# Patient Record
Sex: FEMALE | Race: WHITE | Employment: OTHER | URBAN - METROPOLITAN AREA
[De-identification: names, ages, dates, MRNs, and addresses within clinical notes are randomized per-mention and may not be internally consistent; named-entity substitution may affect disease eponyms.]

---

## 2022-01-14 ENCOUNTER — TRANSFERRED RECORDS (OUTPATIENT)
Dept: HEALTH INFORMATION MANAGEMENT | Facility: CLINIC | Age: 67
End: 2022-01-14
Payer: COMMERCIAL

## 2022-01-28 ENCOUNTER — TRANSFERRED RECORDS (OUTPATIENT)
Dept: HEALTH INFORMATION MANAGEMENT | Facility: CLINIC | Age: 67
End: 2022-01-28
Payer: COMMERCIAL

## 2022-02-09 ENCOUNTER — TRANSCRIBE ORDERS (OUTPATIENT)
Dept: OTHER | Age: 67
End: 2022-02-09
Payer: COMMERCIAL

## 2022-02-09 DIAGNOSIS — Q82.8 PSEUDOXANTHOMA ELASTICUM: Primary | ICD-10-CM

## 2022-02-24 ENCOUNTER — VIRTUAL VISIT (OUTPATIENT)
Dept: CONSULT | Facility: CLINIC | Age: 67
End: 2022-02-24
Attending: GENETIC COUNSELOR, MS
Payer: COMMERCIAL

## 2022-02-24 DIAGNOSIS — Q82.8 PSEUDOXANTHOMA ELASTICUM: ICD-10-CM

## 2022-02-24 PROCEDURE — 96040 HC GENETIC COUNSELING, EACH 30 MINUTES: CPT | Mod: GT,95 | Performed by: GENETIC COUNSELOR, MS

## 2022-02-24 NOTE — PROGRESS NOTES
How would you like to obtain your AVS? MyChart  If the video visit is dropped, the invitation should be resent by: Send to e-mail at: [email or web address]  Will anyone else be joining your video visit? Sapphire Pelaez, EMT

## 2022-02-24 NOTE — LETTER
2/24/2022      RE: Catie Vazquez  [address] [place] [address]       Date of Service: February 24, 2022    Referring Provider: Dr. 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      Catie had a recent punch biopsy of her right axilla region due to yellowish papules and plaques.  She has these papules on her skin near both armpits, on her neck and near her groin.  The biopsy revealed pseudoxanthoma elasticum.  Catie's medical history also includes a left retinal detachment, left cataract, hypertension and sleep apnea.      Medical History:    Pseudoxanthoma elasticum    Retinal detachment s/p repair    Cataract, left    Hypertension    Sleep apnea    Family History:   A three generation pedigree was obtained today, will be scanned into the EMR, and is outlined below.       Catie has three daughters, all of whom are healthy.  Catie has seven healthy grandchildren.       Catie has two siblings.  Her brother was diagnosed with adrenomyeloneuropathy (AMN) in his early 20's.  AMN is an adult-onset form of X-linked adrenoleukodystrophy.  This brother has two children, including a son who had colitis and a daughter who is healthy and is a carrier of AMN.  Catie's sister is reported to be a carrier of AMN, and she has hypothyroidism.  This sister has two healthy children, and one of her granddaughters has epilepsy and is described as a slow learner.  Based on this family history, there is a 50% chance that Catie is a carrier of AMN.  Some carrier females can exhibit mild symptoms of AMN.  If the gene mutation associated with AMN has been identified for Catie's brother, then familial mutation testing would be an option for Catie if she would like to clarify the risk to her children and grandchildren.        Maternal family  history: Catie's mother had progressive spastic paraparesis that was diagnosed in her 40's, and she eventually developed vision loss attributed to this condition.  Catie described it as a form of MS.  Her mother  in her 70's.  It is unclear if her mother's spastic paraparesis diagnosis was actually hereditary spastic paraplegia (HSP).  If Catie's mother had HSP, then Catie and other relatives could be at risk to develop this condition.  Most cases of HSP exhibit autosomal dominant inheritance.  If Catie is able to learn more about her mother's specific condition, this would help to clarify the risk to other relatives and possible testing options.      Paternal family history: Catie's father  at age 86 from natural causes.  He had a history of hypertension, Parkinson disease, and he was hard of hearing as an adult.  Catie's paternal grandfather  at age 59 from throat cancer, and one of Catie's paternal uncles had prostate cancer.      The family history is otherwise negative for reports of birth defects, intellectual disability, other genetic disorders, seizures, congenital vision and hearing loss, and recurrent pregnancy loss / stillbirth.      Catie's maternal ancestry is [place]i.  Her paternal ancestry is Nigerien, [place]anian and [place].  There is no known consanguinity in the family.    Genetics:  We briefly reviewed that our genetic information (DNA) directs all aspects of our bodies' growth and development.  This information is encoded in individual units called genes.  Our genes come in pairs; one copy comes from our mother and one copy comes from our father.  Sometimes, there can be an error in the DNA code that makes up a gene, and the body cannot understand the genetic instruction.  Mistakes like these are called  mutations  or  pathogenic variants , and they can cause genetic disorders.  In some cases the genetic condition is inherited from one or both parents, and some cases occur brand new (de  ayesha) in the affected individual.    Pseudoxanthoma elasticum (PXE) is a genetic disorder characterized by the accumulation of deposits of calcium and other minerals in elastic fibers, which are a component of connective tissue.  This mineralization can impact several areas throughout the body including the skin (cobblestone rash), eyes (retinal changes leading to vision loss), cardiovascular system (narrowing of blood vessels, calcification of the arteries), and gastrointestinal system (GI bleeding).  Symptoms can be highly variable.  Some individuals have several features that can be progressive over time.  Other individuals have very few symptoms.    PXE is caused by mutations in the ABCC6 gene.  Affected individuals have a mutation in both copies of the gene (total of two mutations).  Parents of an affected individual have a mutation in just one copy of the gene and are carriers of the condition.  When both parents are carriers, every child between the couple has a 25% chance to inherit both mutations and have the condition.  This is known as autosomal recessive inheritance.    Genetic Testing:  Genetic testing for Catie will be done by [facility], and will involve analysis of ABCC6 gene to look for any errors in the sequence of the DNA letters (misspellings), or if there are any missing or extra DNA letters (deletions and duplications).  These types of errors can disrupt a gene and cause it to not work properly.    We reviewed the benefits, limitations, and possible results from genetic testing which can include:      Positive - a mutation(s) was identified that is known to be associated with PXE, and is thought to explain Catie's features.  A positive result may provide more information on appropriate clinical management for Catie.        Negative/normal - no mutations were identified in the ABCC6 gene.  All genetic tests have limitations, and a negative result would not exclude a genetic cause for Catie's  symptoms.      Variant of uncertain significance (VUS) - a change in the DNA sequence of the ABCC6 gene was identified, but there is not enough information to determine if the DNA change is disease-causing or benign.  It is unclear if the variant is contributing to Catie's features.  If a variant of uncertain significance is identified, testing of other relatives may be helpful to provide additional clarification.  In most cases, identification of a VUS does not confirm a diagnosis and does not result in any clinically actionable recommendations.    Genetic test results will influence ongoing management and surveillance for Catie.  If testing for Catie confirms a diagnosis of PXE, she would need regular screening / monitoring for potential complications which can include arteriosclerosis, bleeding and scarring of the retina (which can cause vision loss), and bleeding from blood vessels in the digestive tract.  Genetic results may also help to clarify the risk to other family members.  For these reasons, this recommended genetic testing for Catie is medically necessary.    Billing:  We reviewed the direct insurance billing process through [employer].  Once [facility] receives the sample, the lab will reach out to the insurance company to do an estimation of benefits, and will contact the patient if the estimated out of pocket cost is expected to be > $100.  If the estimated out of pocket cost is too high for the family or if insurance coverage is denied, [employer] offers a patient-pay price of $250.  If the estimated out of pocket cost is expected to be < $100, the patient will not be contacted by the lab and testing will be automatically initiated.    Lab results may be automatically released via [employer].  Department protocol is to hold genetic testing results until we have reviewed them. We will then contact the family directly to disclose the results and ensure they receive a copy of the report. This protocol was  reviewed with the family, who were in agreement to hold the results for genetics review and direct contact.    Plan / Summary:  1. Genetic testing was recommended for Catie and will involve analysis of the ABCC6 gene associated with PXE.  Catie provided informed consent to proceed with the testing.  2. [facility] lab will mail Catie a buccal collection test kit.  Testing will be initiated once the lab has received the sample.  Results should be available in about 4 weeks and will be returned by phone.  3. Further follow up from a genetics perspective will depend on Catie's genetic test results.    4. Catie was provided with my contact information and encouraged to reach out with questions or concerns.       Ly Garnica MS, [facility]  Licensed Genetic Counselor  [facility]  [phone]    Approximate Time Spent in Consultation: 55 minutes     Video-Visit Details     Type of service:  Video Visit    Video Start Time: 8:00 AM  Video End Time: 8:55 AM    Originating Location (pt. Location): Home    Distant Location (provider location):  [facility]        Ly Garnica GC

## 2022-02-24 NOTE — PROGRESS NOTES
Date of Service: February 24, 2022    Referring Provider: Dr. 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      Catie had a recent punch biopsy of her right axilla region due to yellowish papules and plaques.  She has these papules on her skin near both armpits, on her neck and near her groin.  The biopsy revealed pseudoxanthoma elasticum.  Catie's medical history also includes a left retinal detachment, left cataract, hypertension and sleep apnea.      Medical History:    Pseudoxanthoma elasticum    Retinal detachment s/p repair    Cataract, left    Hypertension    Sleep apnea    Family History:   A three generation pedigree was obtained today, will be scanned into the EMR, and is outlined below.       Catie has three daughters, all of whom are healthy.  Catie has seven healthy grandchildren.       Catie has two siblings.  Her brother was diagnosed with adrenomyeloneuropathy (AMN) in his early 20's.  AMN is an adult-onset form of X-linked adrenoleukodystrophy.  This brother has two children, including a son who had colitis and a daughter who is healthy and is a carrier of AMN.  Catie's sister is reported to be a carrier of AMN, and she has hypothyroidism.  This sister has two healthy children, and one of her granddaughters has epilepsy and is described as a slow learner.  Based on this family history, there is a 50% chance that Catie is a carrier of AMN.  Some carrier females can exhibit mild symptoms of AMN.  If the gene mutation associated with AMN has been identified for Catie's brother, then familial mutation testing would be an option for Catie if she would like to clarify the risk to her children and grandchildren.        Maternal family history: Catie's mother had progressive spastic paraparesis that was diagnosed in her  40's, and she eventually developed vision loss attributed to this condition.  Catie described it as a form of MS.  Her mother  in her 70's.  It is unclear if her mother's spastic paraparesis diagnosis was actually hereditary spastic paraplegia (HSP).  If Catie's mother had HSP, then Catie and other relatives could be at risk to develop this condition.  Most cases of HSP exhibit autosomal dominant inheritance.  If Catie is able to learn more about her mother's specific condition, this would help to clarify the risk to other relatives and possible testing options.      Paternal family history: Catie's father  at age 86 from natural causes.  He had a history of hypertension, Parkinson disease, and he was hard of hearing as an adult.  Catie's paternal grandfather  at age 59 from throat cancer, and one of Catie's paternal uncles had prostate cancer.      The family history is otherwise negative for reports of birth defects, intellectual disability, other genetic disorders, seizures, congenital vision and hearing loss, and recurrent pregnancy loss / stillbirth.      Catie's maternal ancestry is [place].  Her paternal ancestry is Ivorian, [place]onian and [place].  There is no known consanguinity in the family.    Genetics:  We briefly reviewed that our genetic information (DNA) directs all aspects of our bodies' growth and development.  This information is encoded in individual units called genes.  Our genes come in pairs; one copy comes from our mother and one copy comes from our father.  Sometimes, there can be an error in the DNA code that makes up a gene, and the body cannot understand the genetic instruction.  Mistakes like these are called  mutations  or  pathogenic variants , and they can cause genetic disorders.  In some cases the genetic condition is inherited from one or both parents, and some cases occur brand new (de ayesha) in the affected individual.    Pseudoxanthoma elasticum (PXE) is a genetic  disorder characterized by the accumulation of deposits of calcium and other minerals in elastic fibers, which are a component of connective tissue.  This mineralization can impact several areas throughout the body including the skin (cobblestone rash), eyes (retinal changes leading to vision loss), cardiovascular system (narrowing of blood vessels, calcification of the arteries), and gastrointestinal system (GI bleeding).  Symptoms can be highly variable.  Some individuals have several features that can be progressive over time.  Other individuals have very few symptoms.    PXE is caused by mutations in the ABCC6 gene.  Affected individuals have a mutation in both copies of the gene (total of two mutations).  Parents of an affected individual have a mutation in just one copy of the gene and are carriers of the condition.  When both parents are carriers, every child between the couple has a 25% chance to inherit both mutations and have the condition.  This is known as autosomal recessive inheritance.    Genetic Testing:  Genetic testing for Catie will be done by [facility], and will involve analysis of ABCC6 gene to look for any errors in the sequence of the DNA letters (misspellings), or if there are any missing or extra DNA letters (deletions and duplications).  These types of errors can disrupt a gene and cause it to not work properly.    We reviewed the benefits, limitations, and possible results from genetic testing which can include:      Positive - a mutation(s) was identified that is known to be associated with PXE, and is thought to explain Catie's features.  A positive result may provide more information on appropriate clinical management for Catie.        Negative/normal - no mutations were identified in the ABCC6 gene.  All genetic tests have limitations, and a negative result would not exclude a genetic cause for Catie's symptoms.      Variant of uncertain significance (VUS) - a change in the DNA  sequence of the ABCC6 gene was identified, but there is not enough information to determine if the DNA change is disease-causing or benign.  It is unclear if the variant is contributing to Catie's features.  If a variant of uncertain significance is identified, testing of other relatives may be helpful to provide additional clarification.  In most cases, identification of a VUS does not confirm a diagnosis and does not result in any clinically actionable recommendations.    Genetic test results will influence ongoing management and surveillance for Catie.  If testing for Catie confirms a diagnosis of PXE, she would need regular screening / monitoring for potential complications which can include arteriosclerosis, bleeding and scarring of the retina (which can cause vision loss), and bleeding from blood vessels in the digestive tract.  Genetic results may also help to clarify the risk to other family members.  For these reasons, this recommended genetic testing for Catie is medically necessary.    Billing:  We reviewed the direct insurance billing process through [employer].  Once [facility] receives the sample, the lab will reach out to the insurance company to do an estimation of benefits, and will contact the patient if the estimated out of pocket cost is expected to be > $100.  If the estimated out of pocket cost is too high for the family or if insurance coverage is denied, [employer] offers a patient-pay price of $250.  If the estimated out of pocket cost is expected to be < $100, the patient will not be contacted by the lab and testing will be automatically initiated.    Lab results may be automatically released via [employer].  Department protocol is to hold genetic testing results until we have reviewed them. We will then contact the family directly to disclose the results and ensure they receive a copy of the report. This protocol was reviewed with the family, who were in agreement to hold the results for  genetics review and direct contact.    Plan / Summary:  1. Genetic testing was recommended for Catie and will involve analysis of the ABCC6 gene associated with PXE.  Catie provided informed consent to proceed with the testing.  2. [facility] lab will mail Catie a buccal collection test kit.  Testing will be initiated once the lab has received the sample.  Results should be available in about 4 weeks and will be returned by phone.  3. Further follow up from a genetics perspective will depend on Catie's genetic test results.    4. Catie was provided with my contact information and encouraged to reach out with questions or concerns.       Ly Garnica MS, [facility]  Licensed Genetic Counselor  [facility]  [phone]    Approximate Time Spent in Consultation: 55 minutes     Video-Visit Details     Type of service:  Video Visit    Video Start Time: 8:00 AM  Video End Time: 8:55 AM    Originating Location (pt. Location): Home    Distant Location (provider location):  [facility]    Mode of Communication:  Video Conference via [employer]

## 2022-03-19 ENCOUNTER — HEALTH MAINTENANCE LETTER (OUTPATIENT)
Age: 67
End: 2022-03-19

## 2022-03-22 ENCOUNTER — TELEPHONE (OUTPATIENT)
Dept: CONSULT | Facility: CLINIC | Age: 67
End: 2022-03-22
Payer: COMMERCIAL

## 2022-03-22 NOTE — TELEPHONE ENCOUNTER
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    Genetic Test Results: Genetic testing was done by [facility] and involved sequencing and deletion / duplication analysis of the ABCC6 gene associated with pseudoxanthoma elasticum.  Results are NEGATIVE - no mutations were identified in the gene.  Discussed that all genetic tests have limitations, and in particular, there is a portion of the ABCC6 gene that is not analyzed due to two pseudogenes that overlap that region (a variant identified in this region may be located in the pseudogene and not the actual gene, making interpretation difficult).  It is possible, though perhaps unlikely, that Catie has two rare mutations in the ABCC6 gene that were missed by the genetic test assay.  Therefore, this negative result does not exclude a diagnosis of PXE.    Given that Catie's results are negative, there is no specific genetic testing that can be offered to her daughters to further clarify their risk.  PXE is an autosomal recessive condition.  If PXE is still an accurate diagnosis for Catie, then the risk to her daughters would be low (they would be obligate carriers, and the only way they could have PXE would be if their father happened to be a carrier, which is unlikely).      Summary & Recommendations: Although genetic test results for Catie are negative, this does not exclude a diagnosis of PXE.  I encouraged Catie to follow up with her dermatologist and to inquire about whether PXE remains her working diagnosis, or whether another diagnosis should be considered.  Results were released to Catie through the secure [facility] portal.  Catie has my contact information and was encouraged to reach out with questions or if I can be of further assistance.  Will fax a copy of the result and this phone note to dermatology (referring) and PCP.      Ly  Danika, MS, [facility]  Licensed Genetic Counselor  [facility] [place]

## 2022-09-03 ENCOUNTER — HEALTH MAINTENANCE LETTER (OUTPATIENT)
Age: 67
End: 2022-09-03

## 2023-04-29 ENCOUNTER — HEALTH MAINTENANCE LETTER (OUTPATIENT)
Age: 68
End: 2023-04-29

## 2024-04-27 ENCOUNTER — HEALTH MAINTENANCE LETTER (OUTPATIENT)
Age: 69
End: 2024-04-27

## 2024-07-06 ENCOUNTER — HEALTH MAINTENANCE LETTER (OUTPATIENT)
Age: 69
End: 2024-07-06